# Patient Record
Sex: FEMALE | Race: WHITE | URBAN - METROPOLITAN AREA
[De-identification: names, ages, dates, MRNs, and addresses within clinical notes are randomized per-mention and may not be internally consistent; named-entity substitution may affect disease eponyms.]

---

## 2018-10-09 ENCOUNTER — OFFICE VISIT (OUTPATIENT)
Dept: OBGYN CLINIC | Facility: CLINIC | Age: 28
End: 2018-10-09
Payer: COMMERCIAL

## 2018-10-09 VITALS — BODY MASS INDEX: 22.23 KG/M2 | WEIGHT: 130.19 LBS | RESPIRATION RATE: 16 BRPM | HEIGHT: 64 IN | TEMPERATURE: 98 F

## 2018-10-09 DIAGNOSIS — Z12.4 CERVICAL CANCER SCREENING: ICD-10-CM

## 2018-10-09 DIAGNOSIS — Z01.419 WELL FEMALE EXAM WITH ROUTINE GYNECOLOGICAL EXAM: Primary | ICD-10-CM

## 2018-10-09 DIAGNOSIS — Z30.431 IUD CHECK UP: ICD-10-CM

## 2018-10-09 PROCEDURE — 88175 CYTOPATH C/V AUTO FLUID REDO: CPT | Performed by: OBSTETRICS & GYNECOLOGY

## 2018-10-09 PROCEDURE — 99385 PREV VISIT NEW AGE 18-39: CPT | Performed by: OBSTETRICS & GYNECOLOGY

## 2018-10-09 RX ORDER — COPPER 313.4 MG/1
INTRAUTERINE DEVICE INTRAUTERINE
COMMUNITY

## 2018-10-09 NOTE — PROGRESS NOTES
GYN H&P     10/9/2018  12:01 PM    CC: Patient is here for annual    HPI: patient is a 32year old  here for her annual gyn exam.   She has no complaints. Menses are regular, but long about 12 days, light brown, no pain.  Feels secondary to iud, decl Binge frequency: Never      Drug use: No      Sexual activity: Yes        Partners: Male        Birth control/protection: IUD    Other Topics      Concerns:         Service: Not Asked        Blood Transfusions: Not Asked        Caffeine Concern: Heather Edwards nourished, in no apparent distress  SKIN: no rashes, no suspicious lesions  HEENT: normal  NECK: supple; no thyroidmegaly, no adenopathy  LUNGS: clear to auscultation  CARDIOVASCULAR: normal S1, S2, RRR  BREASTS: + bilateral implants, firm, nontendder, no